# Patient Record
(demographics unavailable — no encounter records)

---

## 2025-01-17 NOTE — HISTORY OF PRESENT ILLNESS
[Home] : at home, [unfilled] , at the time of the visit. [Medical Office: ([facility])___] : at the medical office located in  [Other:____] : [unfilled] [Verbal consent obtained from patient] : the patient, [unfilled] [FreeTextEntry8] : Pt became dizzy yesterday and was nauseas, she even vomited once.  BP was 160/90 last night, BP was better today at 130/70s.  She denied recent URI symptoms.  She had similar symptoms in the past and has a medication given by MD in [facility] that helped her, but does not remember the name of meds.  She had URI recently and went to urgent care about 1-2 months ago and was given ABx, she was better after a few days.

## 2025-01-17 NOTE — PHYSICAL EXAM
[No Acute Distress] : no acute distress [No Respiratory Distress] : no respiratory distress  [Speech Grossly Normal] : speech grossly normal [Alert and Oriented x3] : oriented to person, place, and time [de-identified] : elderly female,

## 2025-01-17 NOTE — ASSESSMENT
[FreeTextEntry1] : She is going to [facility] next month and will stay there for a few months.

## 2025-01-17 NOTE — PHYSICAL EXAM
[No Acute Distress] : no acute distress [No Respiratory Distress] : no respiratory distress  [Speech Grossly Normal] : speech grossly normal [Alert and Oriented x3] : oriented to person, place, and time [de-identified] : elderly female,

## 2025-01-17 NOTE — ASSESSMENT
[FreeTextEntry1] : She is going to [facility] next month and will stay there for a few months.